# Patient Record
Sex: FEMALE | Race: WHITE | NOT HISPANIC OR LATINO | URBAN - METROPOLITAN AREA
[De-identification: names, ages, dates, MRNs, and addresses within clinical notes are randomized per-mention and may not be internally consistent; named-entity substitution may affect disease eponyms.]

---
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
## 2021-01-23 NOTE — PROCEDURE: RECOMMENDATIONS
Recommendations (Free Text): Amlactin everyday \\nNo picking or scrubbing at areas
Detail Level: Detailed
Recommendations (Free Text): Continue Retin A to spot treat\\nContinue Onexton to a clean dry face nightly \\nCerave moisturizer twice a day\\nCerave or aveeno sunscreen twice a day
03588 - Inpatient Low Complexity